# Patient Record
Sex: MALE | Race: BLACK OR AFRICAN AMERICAN | NOT HISPANIC OR LATINO | URBAN - METROPOLITAN AREA
[De-identification: names, ages, dates, MRNs, and addresses within clinical notes are randomized per-mention and may not be internally consistent; named-entity substitution may affect disease eponyms.]

---

## 2020-10-03 ENCOUNTER — OFFICE VISIT (OUTPATIENT)
Dept: URBAN - METROPOLITAN AREA TELEHEALTH 2 | Facility: TELEHEALTH | Age: 35
End: 2020-10-03
Payer: COMMERCIAL

## 2020-10-03 DIAGNOSIS — R10.84 ABDOMINAL PAIN, GENERALIZED: ICD-10-CM

## 2020-10-03 DIAGNOSIS — K59.09 CHRONIC CONSTIPATION: ICD-10-CM

## 2020-10-03 PROCEDURE — 1036F TOBACCO NON-USER: CPT | Performed by: INTERNAL MEDICINE

## 2020-10-03 PROCEDURE — G8427 DOCREV CUR MEDS BY ELIG CLIN: HCPCS | Performed by: INTERNAL MEDICINE

## 2020-10-03 PROCEDURE — G8422 PT INELIG BMI CALCULATION: HCPCS | Performed by: INTERNAL MEDICINE

## 2020-10-03 PROCEDURE — 99203 OFFICE O/P NEW LOW 30 MIN: CPT | Performed by: INTERNAL MEDICINE

## 2020-10-03 PROCEDURE — G9903 PT SCRN TBCO ID AS NON USER: HCPCS | Performed by: INTERNAL MEDICINE

## 2020-10-03 RX ORDER — BISACODYL 5 MG
1 TABLET AS NEEDED TABLET, DELAYED RELEASE (ENTERIC COATED) ORAL ONCE A DAY
Qty: 30 TABLET | Status: ACTIVE | COMMUNITY

## 2020-10-03 RX ORDER — SODIUM, POTASSIUM,MAG SULFATES 17.5-3.13G
354ML SOLUTION, RECONSTITUTED, ORAL ORAL
Qty: 354 MILLILITER | Refills: 0 | OUTPATIENT
Start: 2020-10-04 | End: 2020-10-05

## 2020-10-03 RX ORDER — ESOMEPRAZOLE MAGNESIUM 20 MG/1
1 CAPSULE CAPSULE, DELAYED RELEASE ORAL ONCE A DAY
Qty: 30 CAPSULE | Status: ACTIVE | COMMUNITY

## 2020-10-03 NOTE — HPI-TODAY'S VISIT:
34 yo pt here for abdominal pain evaluation. Kown IBS.  Seven wks ago L-sided neck pain, L-back pain, rather constant. Feels like "pain - gas trapped in my back and neck". Taking " lots"  BC powder, 6 to 7 /d w/ improvement in his sxs.  He adnits to a gurgling feeling in back and abdomen. L-chest and back pain is tender to touch. He was seen in the ER: Dx'd w/ MSK and Anxiety disorder; Rx'w/ Toradol and Valium. He felt better for 4 weeks. Had been taking BC powder and no other analgesics. Last week, after he came back to [place] from [place], he had recurrence of similar pain, and some earlobe inflamation. Seen at Urgent Care: Cefadroxyl / Hydrocodone / Robaxin. He has been having some mild constipation, better w/  OTC Laxatives and GAS-Ex. HE admits obtaining some relief from his pain after a bm. Denies melenic stools nor hematochezia. Has had intermittent loose stools Seen at [facility] ER a week ago: Normal EKG, labs and A + P CT scan, no copy available. Treated again w/ Toradol for MSK pain. COVID test is pending. On Dulcolax w/ normal bm's and less pain. Has been on Nexium OTC as well. Using marijuana daily w/ which he has less pain. Had a normal cystoscopy last year. He has no fever or chills. Denies cardiorespiratory sxs No other complaints after extensive ROS.

## 2020-10-06 ENCOUNTER — TELEPHONE ENCOUNTER (OUTPATIENT)
Dept: URBAN - METROPOLITAN AREA CLINIC 98 | Facility: CLINIC | Age: 35
End: 2020-10-06

## 2020-10-06 ENCOUNTER — WEB ENCOUNTER (OUTPATIENT)
Dept: URBAN - METROPOLITAN AREA CLINIC 98 | Facility: CLINIC | Age: 35
End: 2020-10-06

## 2020-10-07 ENCOUNTER — WEB ENCOUNTER (OUTPATIENT)
Dept: URBAN - METROPOLITAN AREA CLINIC 98 | Facility: CLINIC | Age: 35
End: 2020-10-07

## 2020-10-08 ENCOUNTER — OFFICE VISIT (OUTPATIENT)
Dept: URBAN - METROPOLITAN AREA SURGERY CENTER 18 | Facility: SURGERY CENTER | Age: 35
End: 2020-10-08
Payer: COMMERCIAL

## 2020-10-08 ENCOUNTER — TELEPHONE ENCOUNTER (OUTPATIENT)
Dept: URBAN - METROPOLITAN AREA CLINIC 92 | Facility: CLINIC | Age: 35
End: 2020-10-08

## 2020-10-08 ENCOUNTER — LAB OUTSIDE AN ENCOUNTER (OUTPATIENT)
Dept: URBAN - METROPOLITAN AREA CLINIC 98 | Facility: CLINIC | Age: 35
End: 2020-10-08

## 2020-10-08 DIAGNOSIS — K63.89 BACTERIAL OVERGROWTH SYNDROME: ICD-10-CM

## 2020-10-08 DIAGNOSIS — R10.84 COLICKY ABDOMINAL PAIN: ICD-10-CM

## 2020-10-08 DIAGNOSIS — K29.30 CHRONIC SUPERFICIAL GASTRITIS, PRESENCE OF BLEEDING UNSPECIFIED: ICD-10-CM

## 2020-10-08 PROCEDURE — 43239 EGD BIOPSY SINGLE/MULTIPLE: CPT | Performed by: INTERNAL MEDICINE

## 2020-10-08 PROCEDURE — G9937 DIG OR SURV COLSCO: HCPCS | Performed by: INTERNAL MEDICINE

## 2020-10-08 PROCEDURE — 45385 COLONOSCOPY W/LESION REMOVAL: CPT | Performed by: INTERNAL MEDICINE

## 2020-10-08 PROCEDURE — G8907 PT DOC NO EVENTS ON DISCHARG: HCPCS | Performed by: INTERNAL MEDICINE

## 2020-10-08 RX ORDER — ESOMEPRAZOLE MAGNESIUM 20 MG/1
1 CAPSULE CAPSULE, DELAYED RELEASE ORAL ONCE A DAY
Qty: 30 CAPSULE | Status: ACTIVE | COMMUNITY

## 2020-10-08 RX ORDER — BISACODYL 5 MG
1 TABLET AS NEEDED TABLET, DELAYED RELEASE (ENTERIC COATED) ORAL ONCE A DAY
Qty: 30 TABLET | Status: ACTIVE | COMMUNITY

## 2020-10-12 LAB
A/G RATIO: 2
ALBUMIN: 4.9
ALKALINE PHOSPHATASE: 54
ALT (SGPT): 36
AST (SGOT): 23
BILIRUBIN, TOTAL: 0.4
BUN/CREATININE RATIO: 10
BUN: 11
CALCIUM: 10
CARBON DIOXIDE, TOTAL: 21
CHLORIDE: 103
CK-BB: 0
CK-MB: 0
CK-MM: 100
CREATINE KINASE,TOTAL: 286
CREATININE: 1.08
EGFR IF AFRICN AM: 102
EGFR IF NONAFRICN AM: 88
GLOBULIN, TOTAL: 2.4
GLUCOSE: 82
HEMATOCRIT: 44.9
HEMOGLOBIN: 14.9
LIPASE: 31
MACRO TYPE 1: 0
MACRO TYPE 2: 0
MCH: 31.2
MCHC: 33.2
MCV: 94
NRBC: (no result)
PLATELETS: 282
POTASSIUM: 4.5
PROTEIN, TOTAL: 7.3
RBC: 4.77
RDW: 12.3
SODIUM: 139
T4,FREE(DIRECT): 1.26
TSH: 1.55
WBC: 7.8

## 2020-10-14 ENCOUNTER — WEB ENCOUNTER (OUTPATIENT)
Dept: URBAN - METROPOLITAN AREA CLINIC 98 | Facility: CLINIC | Age: 35
End: 2020-10-14

## 2020-10-14 ENCOUNTER — OFFICE VISIT (OUTPATIENT)
Dept: URBAN - METROPOLITAN AREA CLINIC 98 | Facility: CLINIC | Age: 35
End: 2020-10-14

## 2020-10-15 ENCOUNTER — WEB ENCOUNTER (OUTPATIENT)
Dept: URBAN - METROPOLITAN AREA CLINIC 98 | Facility: CLINIC | Age: 35
End: 2020-10-15

## 2020-10-16 ENCOUNTER — OFFICE VISIT (OUTPATIENT)
Dept: URBAN - METROPOLITAN AREA CLINIC 98 | Facility: CLINIC | Age: 35
End: 2020-10-16
Payer: COMMERCIAL

## 2020-10-16 DIAGNOSIS — R10.13 DYSPEPSIA: ICD-10-CM

## 2020-10-16 DIAGNOSIS — M54.2 NECK PAIN: ICD-10-CM

## 2020-10-16 PROCEDURE — G9903 PT SCRN TBCO ID AS NON USER: HCPCS | Performed by: INTERNAL MEDICINE

## 2020-10-16 PROCEDURE — 1036F TOBACCO NON-USER: CPT | Performed by: INTERNAL MEDICINE

## 2020-10-16 PROCEDURE — 99213 OFFICE O/P EST LOW 20 MIN: CPT | Performed by: INTERNAL MEDICINE

## 2020-10-16 PROCEDURE — G8422 PT INELIG BMI CALCULATION: HCPCS | Performed by: INTERNAL MEDICINE

## 2020-10-16 PROCEDURE — G8427 DOCREV CUR MEDS BY ELIG CLIN: HCPCS | Performed by: INTERNAL MEDICINE

## 2020-10-16 RX ORDER — LANSOPRAZOLE 30 MG/1
1 CAPSULE BEFORE A MEAL CAPSULE, DELAYED RELEASE ORAL ONCE A DAY
Qty: 30 | Refills: 3 | OUTPATIENT
Start: 2020-10-16

## 2020-10-16 RX ORDER — ESOMEPRAZOLE MAGNESIUM 20 MG/1
1 CAPSULE CAPSULE, DELAYED RELEASE ORAL ONCE A DAY
Qty: 30 CAPSULE | Status: ACTIVE | COMMUNITY

## 2020-10-16 RX ORDER — BISACODYL 5 MG
1 TABLET AS NEEDED TABLET, DELAYED RELEASE (ENTERIC COATED) ORAL ONCE A DAY
Qty: 30 TABLET | Status: ACTIVE | COMMUNITY

## 2020-10-16 NOTE — HPI-TODAY'S VISIT:
34 yo pt for abdominal pain f/u. He' still w/ c/o throbbing feeling at the base of his neck, w  radiation to the shoulder blade, constant " feeling of pressure in my neck " and  " I hear bubbles in my stomach when I press on my neck ". Better w/ decubitus position and when applying a heating pad. Tried baking soda but caused some nausea; Dulcolax helps w/ abdominal discomfort. No fever or chills. No HB / R and no p-prandial sxs.  EGD: NERD, sm HH and Hp-negative gastritis. Colonoscopy: sigmoid spasm and lymphoid aggregate. Has been seen by Ortho and has been scheduled for an MRI. Has no other GI complaints after extensive ROS.,  Denies anorexia or weight loss.

## 2020-10-21 ENCOUNTER — DASHBOARD ENCOUNTERS (OUTPATIENT)
Age: 35
End: 2020-10-21

## 2024-08-29 ENCOUNTER — APPOINTMENT (RX ONLY)
Dept: URBAN - METROPOLITAN AREA CLINIC 50 | Facility: CLINIC | Age: 39
Setting detail: DERMATOLOGY
End: 2024-08-29

## 2024-08-29 DIAGNOSIS — L73.2 HIDRADENITIS SUPPURATIVA: ICD-10-CM

## 2024-08-29 DIAGNOSIS — Q82.8 OTHER SPECIFIED CONGENITAL MALFORMATIONS OF SKIN: ICD-10-CM

## 2024-08-29 PROCEDURE — ? COUNSELING

## 2024-08-29 PROCEDURE — ? PRESCRIPTION

## 2024-08-29 PROCEDURE — 99204 OFFICE O/P NEW MOD 45 MIN: CPT

## 2024-08-29 PROCEDURE — ? PRESCRIPTION MEDICATION MANAGEMENT

## 2024-08-29 RX ORDER — CLINDAMYCIN PHOSPHATE 10 MG/G
GEL TOPICAL BID
Qty: 60 | Refills: 3 | Status: ERX | COMMUNITY
Start: 2024-08-29

## 2024-08-29 RX ORDER — PHARMACY COMPOUNDING ACCESSORY
EACH MISCELLANEOUS
Qty: 30 | Refills: 2 | Status: ERX | COMMUNITY
Start: 2024-08-29

## 2024-08-29 RX ADMIN — Medication: at 00:00

## 2024-08-29 RX ADMIN — CLINDAMYCIN PHOSPHATE: 10 GEL TOPICAL at 00:00

## 2024-08-29 ASSESSMENT — LOCATION SIMPLE DESCRIPTION DERM
LOCATION SIMPLE: LEFT EAR
LOCATION SIMPLE: ABDOMEN
LOCATION SIMPLE: LEFT PLANTAR SURFACE
LOCATION SIMPLE: RIGHT PLANTAR SURFACE
LOCATION SIMPLE: RIGHT EAR

## 2024-08-29 ASSESSMENT — LOCATION DETAILED DESCRIPTION DERM
LOCATION DETAILED: LEFT MEDIAL PLANTAR MIDFOOT
LOCATION DETAILED: PERIUMBILICAL SKIN
LOCATION DETAILED: RIGHT INSTEP
LOCATION DETAILED: LEFT ANTERIOR EARLOBE
LOCATION DETAILED: RIGHT ANTERIOR EARLOBE

## 2024-08-29 ASSESSMENT — LOCATION ZONE DERM
LOCATION ZONE: TRUNK
LOCATION ZONE: EAR
LOCATION ZONE: FEET

## 2024-08-29 ASSESSMENT — SEVERITY ASSESSMENT: SEVERITY: MILD TO MODERATE

## 2024-08-29 ASSESSMENT — PAIN INTENSITY VAS: HOW INTENSE IS YOUR PAIN 0 BEING NO PAIN, 10 BEING THE MOST SEVERE PAIN POSSIBLE?: 2/10 PAIN

## 2024-08-29 NOTE — HPI: SKIN LESIONS
How Severe Is Your Skin Lesion?: moderate
Have Your Skin Lesions Been Treated?: not been treated
Is This A New Presentation, Or A Follow-Up?: Skin Lesions
Additional History: Patient is being seen today with concerns of boils. In the past he’s used HIBCLENS, PRID, ANTIBIOTIC (DOXYCYCLINE), CLINDAMYCIN, MUPIROCIN OINT. This has been going on since his late 20 years. The boils come on ears, beard area, and private area.

## 2024-08-29 NOTE — PROCEDURE: PRESCRIPTION MEDICATION MANAGEMENT
Detail Level: Zone
Render In Strict Bullet Format?: No
Initiate Treatment: pharmacy compounding accessory \\nTretinoin 0.1% 12% ammonium lactate 20% Urea cream \\nSig: Apply to affected areas on feet and hands twice a day for x 2 months.\\n\\n*Tried to compound tazarotene, but due to cost compounding pharmacy said it would be very expensive.
Initiate Treatment: CLINDAMYCIN 1 % topical gel BID\\nSig: Apply a thin layer twice daily to affected HS areas. Start immediately when you notice flare up
Plan: Will discuss in further detail at follow-up due to time constraint.

## 2024-08-29 NOTE — HPI: RASH
Is This A New Presentation, Or A Follow-Up?: Rash
Additional History: From ages of  late 20 to current the skin has been thickening on both hands and feet. More recently blisters has started forming.

## 2024-09-05 ENCOUNTER — APPOINTMENT (RX ONLY)
Dept: URBAN - METROPOLITAN AREA CLINIC 50 | Facility: CLINIC | Age: 39
Setting detail: DERMATOLOGY
End: 2024-09-05

## 2024-09-05 DIAGNOSIS — L73.2 HIDRADENITIS SUPPURATIVA: ICD-10-CM

## 2024-09-05 DIAGNOSIS — L91.0 HYPERTROPHIC SCAR: ICD-10-CM

## 2024-09-05 DIAGNOSIS — L40.0 PSORIASIS VULGARIS: ICD-10-CM

## 2024-09-05 DIAGNOSIS — L72.0 EPIDERMAL CYST: ICD-10-CM

## 2024-09-05 DIAGNOSIS — Q82.8 OTHER SPECIFIED CONGENITAL MALFORMATIONS OF SKIN: ICD-10-CM

## 2024-09-05 PROCEDURE — ? COUNSELING

## 2024-09-05 PROCEDURE — ? REFERRAL CORRESPONDENCE

## 2024-09-05 PROCEDURE — 99214 OFFICE O/P EST MOD 30 MIN: CPT

## 2024-09-05 PROCEDURE — ? PRESCRIPTION MEDICATION MANAGEMENT

## 2024-09-05 PROCEDURE — ? PRESCRIPTION

## 2024-09-05 RX ORDER — CLOBETASOL PROPIONATE 0.5 MG/G
OINTMENT TOPICAL BID
Qty: 60 | Refills: 3 | Status: ERX | COMMUNITY
Start: 2024-09-05

## 2024-09-05 RX ADMIN — CLOBETASOL PROPIONATE: 0.5 OINTMENT TOPICAL at 00:00

## 2024-09-05 ASSESSMENT — LOCATION DETAILED DESCRIPTION DERM
LOCATION DETAILED: RIGHT INSTEP
LOCATION DETAILED: LEFT LATERAL PLANTAR MIDFOOT
LOCATION DETAILED: RIGHT PLANTAR FOREFOOT OVERLYING 1ST METATARSAL
LOCATION DETAILED: LEFT PLANTAR FOREFOOT OVERLYING 1ST METATARSAL
LOCATION DETAILED: RIGHT PLANTAR FOREFOOT OVERLYING 5TH METATARSAL
LOCATION DETAILED: LEFT ANTERIOR EARLOBE
LOCATION DETAILED: RIGHT MEDIAL PLANTAR MIDFOOT
LOCATION DETAILED: RIGHT LATERAL PLANTAR MIDFOOT
LOCATION DETAILED: RIGHT ANTERIOR EARLOBE
LOCATION DETAILED: LEFT INSTEP
LOCATION DETAILED: LEFT ANTERIOR MEDIAL PROXIMAL THIGH
LOCATION DETAILED: RIGHT ANTERIOR MEDIAL PROXIMAL THIGH
LOCATION DETAILED: LEFT PLANTAR FOREFOOT OVERLYING 2ND METATARSAL
LOCATION DETAILED: LEFT MEDIAL PLANTAR MIDFOOT

## 2024-09-05 ASSESSMENT — LOCATION ZONE DERM
LOCATION ZONE: FEET
LOCATION ZONE: EAR
LOCATION ZONE: LEG

## 2024-09-05 ASSESSMENT — LOCATION SIMPLE DESCRIPTION DERM
LOCATION SIMPLE: RIGHT PLANTAR SURFACE
LOCATION SIMPLE: LEFT EAR
LOCATION SIMPLE: RIGHT THIGH
LOCATION SIMPLE: LEFT PLANTAR SURFACE
LOCATION SIMPLE: LEFT THIGH
LOCATION SIMPLE: RIGHT EAR

## 2024-09-05 ASSESSMENT — HURLEY STAGE
IN YOUR EXPERIENCE, AMONG ALL PATIENTS YOU HAVE SEEN WITH THIS CONDITION, HOW SEVERE IS THIS PATIENT'S CONDITION?: HURLEY STAGE I: ABSCESS FORMATION (SINGLE OR MULTIPLE) WITHOUT SINUS TRACTS OR SCARRING

## 2024-09-05 NOTE — PROCEDURE: PRESCRIPTION MEDICATION MANAGEMENT
Render In Strict Bullet Format?: No
Initiate Treatment: clobetasol 0.05 % topical ointment Bid\\nQuantity: 60.0 g  Days Supply: 30\\nSig: Apply thin layer to affected area on hands and feet twice daily for 2 weeks, then stop for 1 week then repeat as needed for flares avoid face and folds
Detail Level: Zone
Plan: Will discuss Bimzelx at follow up visit.
Initiate Treatment: Discussed using cerave benzoyl peroxide cleanser or salicylic acid .
Samples Given: Cerave with salicylic acid , benzoyl peroxide cleanser.
Continue Regimen: Clindamycin gel\\nHibiHibiclens in shower daily
Discontinue Regimen: Pharmacy Compounding with Tazaraotene and ammonium lactate and Urea Cream.